# Patient Record
Sex: MALE | Race: BLACK OR AFRICAN AMERICAN | URBAN - METROPOLITAN AREA
[De-identification: names, ages, dates, MRNs, and addresses within clinical notes are randomized per-mention and may not be internally consistent; named-entity substitution may affect disease eponyms.]

---
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
## 2022-01-27 ENCOUNTER — CONSULTATION/EVALUATION (OUTPATIENT)
Dept: URBAN - METROPOLITAN AREA CLINIC 14 | Facility: CLINIC | Age: 22
End: 2022-01-27

## 2022-01-27 DIAGNOSIS — H52.7: ICD-10-CM

## 2022-01-27 PROCEDURE — 99499LK REFRACTIVE CONSULT/LASIK

## 2022-01-27 ASSESSMENT — VISUAL ACUITY
OS_SC: 20/400
OD_SC: 20/400

## 2022-01-27 ASSESSMENT — KERATOMETRY
OD_K1POWER_DIOPTERS: 41.75
OS_AXISANGLE2_DEGREES: 89
OS_K2POWER_DIOPTERS: 44.75
OS_K1POWER_DIOPTERS: 42.25
OS_AXISANGLE_DEGREES: 179
OD_AXISANGLE_DEGREES: 174
OD_K2POWER_DIOPTERS: 44.25
OD_AXISANGLE2_DEGREES: 84

## 2022-01-27 ASSESSMENT — PACHYMETRY
OS_CT_UM: 515
OD_CT_UM: 502
